# Patient Record
Sex: FEMALE | Race: ASIAN | Employment: FULL TIME | URBAN - METROPOLITAN AREA
[De-identification: names, ages, dates, MRNs, and addresses within clinical notes are randomized per-mention and may not be internally consistent; named-entity substitution may affect disease eponyms.]

---

## 2024-06-29 ENCOUNTER — APPOINTMENT (OUTPATIENT)
Dept: GENERAL RADIOLOGY | Facility: HOSPITAL | Age: 39
End: 2024-06-29
Attending: EMERGENCY MEDICINE
Payer: COMMERCIAL

## 2024-06-29 ENCOUNTER — HOSPITAL ENCOUNTER (EMERGENCY)
Facility: HOSPITAL | Age: 39
Discharge: HOME | End: 2024-06-29
Attending: EMERGENCY MEDICINE
Payer: COMMERCIAL

## 2024-06-29 VITALS
TEMPERATURE: 98 F | BODY MASS INDEX: 30.21 KG/M2 | DIASTOLIC BLOOD PRESSURE: 93 MMHG | WEIGHT: 160 LBS | HEART RATE: 89 BPM | OXYGEN SATURATION: 100 % | HEIGHT: 61 IN | RESPIRATION RATE: 19 BRPM | SYSTOLIC BLOOD PRESSURE: 137 MMHG

## 2024-06-29 DIAGNOSIS — R07.9 ACUTE CHEST PAIN: Primary | ICD-10-CM

## 2024-06-29 LAB
ALBUMIN SERPL-MCNC: 3.6 G/DL (ref 3.4–5)
ALBUMIN/GLOB SERPL: 0.9 {RATIO} (ref 1–2)
ALP LIVER SERPL-CCNC: 67 U/L
ALT SERPL-CCNC: 36 U/L
ANION GAP SERPL CALC-SCNC: 6 MMOL/L (ref 0–18)
AST SERPL-CCNC: 17 U/L (ref 15–37)
ATRIAL RATE: 87 BPM
BASOPHILS # BLD AUTO: 0.03 X10(3) UL (ref 0–0.2)
BASOPHILS NFR BLD AUTO: 0.4 %
BILIRUB SERPL-MCNC: 0.2 MG/DL (ref 0.1–2)
BUN BLD-MCNC: 16 MG/DL (ref 9–23)
CALCIUM BLD-MCNC: 8.4 MG/DL (ref 8.5–10.1)
CHLORIDE SERPL-SCNC: 108 MMOL/L (ref 98–112)
CO2 SERPL-SCNC: 25 MMOL/L (ref 21–32)
CREAT BLD-MCNC: 0.71 MG/DL
EGFRCR SERPLBLD CKD-EPI 2021: 111 ML/MIN/1.73M2 (ref 60–?)
EOSINOPHIL # BLD AUTO: 0.08 X10(3) UL (ref 0–0.7)
EOSINOPHIL NFR BLD AUTO: 1 %
ERYTHROCYTE [DISTWIDTH] IN BLOOD BY AUTOMATED COUNT: 11.4 %
GLOBULIN PLAS-MCNC: 4 G/DL (ref 2.8–4.4)
GLUCOSE BLD-MCNC: 123 MG/DL (ref 70–99)
HCT VFR BLD AUTO: 37.7 %
HGB BLD-MCNC: 13 G/DL
IMM GRANULOCYTES # BLD AUTO: 0.02 X10(3) UL (ref 0–1)
IMM GRANULOCYTES NFR BLD: 0.3 %
LYMPHOCYTES # BLD AUTO: 3.49 X10(3) UL (ref 1–4)
LYMPHOCYTES NFR BLD AUTO: 44.8 %
MCH RBC QN AUTO: 31.4 PG (ref 26–34)
MCHC RBC AUTO-ENTMCNC: 34.5 G/DL (ref 31–37)
MCV RBC AUTO: 91.1 FL
MONOCYTES # BLD AUTO: 0.59 X10(3) UL (ref 0.1–1)
MONOCYTES NFR BLD AUTO: 7.6 %
NEUTROPHILS # BLD AUTO: 3.58 X10 (3) UL (ref 1.5–7.7)
NEUTROPHILS # BLD AUTO: 3.58 X10(3) UL (ref 1.5–7.7)
NEUTROPHILS NFR BLD AUTO: 45.9 %
OSMOLALITY SERPL CALC.SUM OF ELEC: 291 MOSM/KG (ref 275–295)
P AXIS: 60 DEGREES
P-R INTERVAL: 156 MS
PLATELET # BLD AUTO: 269 10(3)UL (ref 150–450)
POTASSIUM SERPL-SCNC: 3.7 MMOL/L (ref 3.5–5.1)
PROT SERPL-MCNC: 7.6 G/DL (ref 6.4–8.2)
Q-T INTERVAL: 374 MS
QRS DURATION: 84 MS
QTC CALCULATION (BEZET): 450 MS
R AXIS: 14 DEGREES
RBC # BLD AUTO: 4.14 X10(6)UL
SODIUM SERPL-SCNC: 139 MMOL/L (ref 136–145)
T AXIS: 43 DEGREES
TROPONIN I SERPL HS-MCNC: 16 NG/L
TROPONIN I SERPL HS-MCNC: 20 NG/L
VENTRICULAR RATE: 87 BPM
WBC # BLD AUTO: 7.8 X10(3) UL (ref 4–11)

## 2024-06-29 PROCEDURE — 99284 EMERGENCY DEPT VISIT MOD MDM: CPT

## 2024-06-29 PROCEDURE — 71045 X-RAY EXAM CHEST 1 VIEW: CPT | Performed by: EMERGENCY MEDICINE

## 2024-06-29 PROCEDURE — 93010 ELECTROCARDIOGRAM REPORT: CPT

## 2024-06-29 PROCEDURE — 99285 EMERGENCY DEPT VISIT HI MDM: CPT

## 2024-06-29 PROCEDURE — 80053 COMPREHEN METABOLIC PANEL: CPT | Performed by: EMERGENCY MEDICINE

## 2024-06-29 PROCEDURE — 93005 ELECTROCARDIOGRAM TRACING: CPT

## 2024-06-29 PROCEDURE — 36415 COLL VENOUS BLD VENIPUNCTURE: CPT

## 2024-06-29 PROCEDURE — 84484 ASSAY OF TROPONIN QUANT: CPT | Performed by: EMERGENCY MEDICINE

## 2024-06-29 PROCEDURE — 85025 COMPLETE CBC W/AUTO DIFF WBC: CPT | Performed by: EMERGENCY MEDICINE

## 2024-06-29 RX ORDER — CYCLOBENZAPRINE HCL 10 MG
10 TABLET ORAL 3 TIMES DAILY PRN
COMMUNITY

## 2024-06-29 RX ORDER — LOSARTAN POTASSIUM AND HYDROCHLOROTHIAZIDE 12.5; 5 MG/1; MG/1
1 TABLET ORAL DAILY
COMMUNITY

## 2024-06-29 RX ORDER — CETIRIZINE HYDROCHLORIDE 10 MG/1
10 TABLET ORAL DAILY
COMMUNITY

## 2024-06-29 RX ORDER — ASPIRIN 81 MG/1
243 TABLET, CHEWABLE ORAL ONCE
Status: COMPLETED | OUTPATIENT
Start: 2024-06-29 | End: 2024-06-29

## 2024-06-29 RX ORDER — ISOSORBIDE MONONITRATE 30 MG/1
30 TABLET, EXTENDED RELEASE ORAL DAILY
COMMUNITY

## 2024-06-29 RX ORDER — HYDROXYZINE HYDROCHLORIDE 10 MG/1
10 TABLET, FILM COATED ORAL EVERY 4 HOURS PRN
COMMUNITY

## 2024-06-29 RX ORDER — ATORVASTATIN CALCIUM 10 MG/1
10 TABLET, FILM COATED ORAL NIGHTLY
COMMUNITY

## 2024-06-29 RX ORDER — NITROGLYCERIN 0.4 MG/1
0.4 TABLET SUBLINGUAL EVERY 5 MIN PRN
COMMUNITY

## 2024-06-29 RX ORDER — ASPIRIN 81 MG/1
81 TABLET, CHEWABLE ORAL DAILY
COMMUNITY

## 2024-06-29 NOTE — ED PROVIDER NOTES
Patient Seen in: [facility] Emergency Department      History     Chief Complaint   Patient presents with    Chest Pain Angina     Had chest pain, had 4 tabs of nitro, cardiologist advised to go to ED, not currently having chest pain     Stated Complaint: Chest pain, had 4 tabs of nitro, cardiologist advised to go to ED    Subjective:   HPI    39-year-old female who is visiting from [place] presents to the emergency department for anterior chest pain this morning that awakened her from sleep around 4:30 AM.  The patient took 2 nitroglycerin tablets over the course of 5 minutes and the pain was gone and within 15 minutes.  An hour later at 5:45 AM she awakened again with chest pain which required 2 nitroglycerin tablets to alleviate.  She then came to the emergency department for evaluation.    The patient has a history of a non-STEMI in April 2022 and has been diagnosed with microvascular disease.  She has had an extensive workup with her cardiologist in [place].  Her chest pain typically occurs at night and usually only takes 3 nitroglycerin tablets to alleviate.  The frequency and intensity of the discomfort has been increasing over the past 3 to 6 months.  Last episode of pain was last week.  Occasionally she has sweatiness, nausea and vomiting but this did not occur tonight.  She denies any shortness of breath.  No unusual food ingestions or activities yesterday.    Objective:   Past Medical History:    Essential hypertension    NSTEMI (non-ST elevated myocardial infarction) (HCC)              Past Surgical History:   Procedure Laterality Date    Lumbar discectomy  2000    L4-L5                Social History     Socioeconomic History    Marital status:    Tobacco Use    Smoking status: Former     Current packs/day: 1.00     Average packs/day: 1 pack/day for 2.8 years (2.8 ttl pk-yrs)     Types: Cigarettes     Start date: 8/30/2021    Smokeless tobacco: Never   Vaping Use    Vaping status:  Never Used   Substance and Sexual Activity    Alcohol use: Not Currently     Comment: Rarely    Drug use: Not Currently              Review of Systems    Positive for stated Chief Complaint: Chest Pain Angina (Had chest pain, had 4 tabs of nitro, cardiologist advised to go to ED, not currently having chest pain)    Other systems are as noted in HPI.  Constitutional and vital signs reviewed.      All other systems reviewed and negative except as noted above.    Physical Exam     ED Triage Vitals   BP 06/29/24 0701 (!) 137/93   Pulse 06/29/24 0701 89   Resp 06/29/24 0701 19   Temp 06/29/24 0701 98 °F (36.7 °C)   Temp src --    SpO2 06/29/24 0701 100 %   O2 Device 06/29/24 0655 None (Room air)       Current Vitals:   Vital Signs  BP: (!) 137/93  Pulse: 89  Resp: 19  Temp: 98 °F (36.7 °C)    Oxygen Therapy  SpO2: 100 %  O2 Device: None (Room air)            Physical Exam    General appearance: This is a young adult female sitting on a gurney.  Vital signs were reviewed per nurses notes.  Monitor reveals a sinus rhythm without ectopy rate in the 80s.  HEENT: Normocephalic atraumatic.  Anicteric sclera.  Oral mucosa is moist.  Oropharynx is normal.  Neck: No adenopathy or thyromegaly.  Lungs are clear to auscultation.  Heart exam: Normal S1-S2 without extra sounds or murmurs.  Regular rate and rhythm.  Chest wall is nontender.  Abdomen is soft and nontender without masses or rebound.  Extremities are atraumatic.  Skin is dry without rashes or lesions.  Neuroexam: Alert and oriented x 4.  Speech is fluent.  Moving all 4 extremities well.    ED Course     Labs Reviewed   COMP METABOLIC PANEL (14) - Abnormal; Notable for the following components:       Result Value    Glucose 123 (*)     Calcium, Total 8.4 (*)     A/G Ratio 0.9 (*)     All other components within normal limits   TROPONIN I HIGH SENSITIVITY - Normal   TROPONIN I HIGH SENSITIVITY - Normal   CBC WITH DIFFERENTIAL WITH PLATELET    Narrative:     The following  orders were created for panel order CBC With Differential With Platelet.  Procedure                               Abnormality         Status                     ---------                               -----------         ------                     CBC W/ DIFFERENTIAL[[id number]]                              Final result                 Please view results for these tests on the individual orders.   RAINBOW DRAW BLUE   CBC W/ DIFFERENTIAL     EKG    Rate, intervals and axes as noted on EKG Report.  Rate: 87  Rhythm: Sinus Rhythm  Reading: Normal EKG.  Agree with EKG report.                 Intravenous access was obtained.  Laboratory studies were drawn.  The patient took 81 mg of aspirin this morning.  Additional 243 mg of aspirin was administered.    XR CHEST AP PORTABLE  (CPT=71045)    Result Date: 6/29/2024  PROCEDURE:  XR CHEST AP PORTABLE  (CPT=71045)  TECHNIQUE:  AP chest radiograph was obtained.  COMPARISON:  None.  INDICATIONS:  Chest pain, had 4 tabs of nitro, cardiologist advised to go to ED  PATIENT STATED HISTORY: (As transcribed by Technologist)  Patient offered no additional history at this time    FINDINGS:  The lungs are clear.  Cardiomediastinal silhouette and vascularity are unremarkable.  No significant osseous abnormalities.  A           CONCLUSION:  Exam is within normal limits.   LOCATION:  [facility]      Dictated by (CST): Katerina Rodriguez DO on 6/29/2024 at 7:58 AM     Finalized by (CST): Katerina Rodriguez DO on 6/29/2024 at 7:58 AM       I personally reviewed the images myself and went over results with patient.    I viewed the chest x-ray films myself and there is no acute cardiopulmonary abnormality.    Baseline and 2-hour troponin levels were both normal.  The patient had no additional episodes of chest pain in the emergency department.  A copy of her test results and EKG were provided at discharge.  Criteria for return to the emergency department discussed.         MDM      #1.  Acute chest pain.   This is a young female with a history of microvascular coronary artery disease and prior non-STEMI.  Persistent chest pain currently undergoing additional diagnostic testing on an outpatient basis with her cardiologist in [place].  No evidence of acute MI currently as EKG and troponins are both normal.  Discharged home and cautioned to return for new or worsening symptoms.                                   Medical Decision Making      Disposition and Plan     Clinical Impression:  1. Acute chest pain         Disposition:  Discharge  6/29/2024  9:23 am    Follow-up:  [facility] [id number] [phone]  Follow up            Medications Prescribed:  Discharge Medication List as of 6/29/2024  9:35 AM

## 2024-06-29 NOTE — ED INITIAL ASSESSMENT (HPI)
Pt arrives ambulatory with c/o CP that woke her up at 0430. Hx of NSTEMI 4/2022. Pt took prescribed SL nitroglycerin, repeated dose at 0435. Pain subsided. Pain cam back at 0545, repeated 2 more nitroglycerin SL. Pt denies SOB, nondiaphoretic. Denies N/V.